# Patient Record
Sex: FEMALE | Race: WHITE | Employment: OTHER | URBAN - NONMETROPOLITAN AREA
[De-identification: names, ages, dates, MRNs, and addresses within clinical notes are randomized per-mention and may not be internally consistent; named-entity substitution may affect disease eponyms.]

---

## 2017-01-24 ENCOUNTER — CLINICAL DOCUMENTATION (OUTPATIENT)
Dept: FAMILY MEDICINE CLINIC | Age: 82
End: 2017-01-24

## 2017-01-24 VITALS
DIASTOLIC BLOOD PRESSURE: 84 MMHG | HEART RATE: 80 BPM | TEMPERATURE: 97.3 F | SYSTOLIC BLOOD PRESSURE: 132 MMHG | BODY MASS INDEX: 23.94 KG/M2 | RESPIRATION RATE: 16 BRPM | WEIGHT: 106.8 LBS

## 2017-01-24 DIAGNOSIS — F02.80 ALZHEIMER'S DEMENTIA WITHOUT BEHAVIORAL DISTURBANCE, UNSPECIFIED TIMING OF DEMENTIA ONSET: Primary | Chronic | ICD-10-CM

## 2017-01-24 DIAGNOSIS — M81.0 OSTEOPOROSIS: Chronic | ICD-10-CM

## 2017-01-24 DIAGNOSIS — G30.9 ALZHEIMER'S DEMENTIA WITHOUT BEHAVIORAL DISTURBANCE, UNSPECIFIED TIMING OF DEMENTIA ONSET: Primary | Chronic | ICD-10-CM

## 2017-01-24 DIAGNOSIS — I10 ESSENTIAL HYPERTENSION: Chronic | ICD-10-CM

## 2017-01-24 DIAGNOSIS — H40.1130 PRIMARY OPEN ANGLE GLAUCOMA OF BOTH EYES, UNSPECIFIED GLAUCOMA STAGE: Chronic | ICD-10-CM

## 2017-01-24 DIAGNOSIS — D69.6 THROMBOCYTOPENIA (HCC): ICD-10-CM

## 2017-01-24 DIAGNOSIS — F41.1 GAD (GENERALIZED ANXIETY DISORDER): Chronic | ICD-10-CM

## 2017-01-24 DIAGNOSIS — M15.9 PRIMARY OSTEOARTHRITIS INVOLVING MULTIPLE JOINTS: Chronic | ICD-10-CM

## 2017-03-28 ENCOUNTER — CLINICAL DOCUMENTATION (OUTPATIENT)
Dept: FAMILY MEDICINE CLINIC | Age: 82
End: 2017-03-28

## 2017-03-28 VITALS
BODY MASS INDEX: 24.12 KG/M2 | HEART RATE: 73 BPM | TEMPERATURE: 99.1 F | SYSTOLIC BLOOD PRESSURE: 114 MMHG | DIASTOLIC BLOOD PRESSURE: 65 MMHG | WEIGHT: 107.2 LBS | RESPIRATION RATE: 16 BRPM | HEIGHT: 56 IN

## 2017-03-28 DIAGNOSIS — M81.0 OSTEOPOROSIS: Chronic | ICD-10-CM

## 2017-03-28 DIAGNOSIS — D69.6 THROMBOCYTOPENIA (HCC): ICD-10-CM

## 2017-03-28 DIAGNOSIS — F02.80 ALZHEIMER'S DEMENTIA WITHOUT BEHAVIORAL DISTURBANCE, UNSPECIFIED TIMING OF DEMENTIA ONSET: Primary | Chronic | ICD-10-CM

## 2017-03-28 DIAGNOSIS — M15.9 PRIMARY OSTEOARTHRITIS INVOLVING MULTIPLE JOINTS: Chronic | ICD-10-CM

## 2017-03-28 DIAGNOSIS — H40.1130 PRIMARY OPEN ANGLE GLAUCOMA OF BOTH EYES, UNSPECIFIED GLAUCOMA STAGE: Chronic | ICD-10-CM

## 2017-03-28 DIAGNOSIS — G30.9 ALZHEIMER'S DEMENTIA WITHOUT BEHAVIORAL DISTURBANCE, UNSPECIFIED TIMING OF DEMENTIA ONSET: Primary | Chronic | ICD-10-CM

## 2017-03-28 DIAGNOSIS — I10 ESSENTIAL HYPERTENSION: Chronic | ICD-10-CM

## 2017-03-28 DIAGNOSIS — F41.1 GAD (GENERALIZED ANXIETY DISORDER): Chronic | ICD-10-CM

## 2017-06-27 ENCOUNTER — CLINICAL DOCUMENTATION (OUTPATIENT)
Dept: FAMILY MEDICINE CLINIC | Age: 82
End: 2017-06-27

## 2017-06-27 VITALS
HEIGHT: 56 IN | WEIGHT: 109.9 LBS | BODY MASS INDEX: 24.72 KG/M2 | RESPIRATION RATE: 16 BRPM | SYSTOLIC BLOOD PRESSURE: 117 MMHG | HEART RATE: 70 BPM | DIASTOLIC BLOOD PRESSURE: 64 MMHG | TEMPERATURE: 97.9 F

## 2017-06-27 DIAGNOSIS — I10 ESSENTIAL HYPERTENSION: Chronic | ICD-10-CM

## 2017-06-27 DIAGNOSIS — F02.80 ALZHEIMER'S DEMENTIA WITHOUT BEHAVIORAL DISTURBANCE, UNSPECIFIED TIMING OF DEMENTIA ONSET: Primary | Chronic | ICD-10-CM

## 2017-06-27 DIAGNOSIS — G30.9 ALZHEIMER'S DEMENTIA WITHOUT BEHAVIORAL DISTURBANCE, UNSPECIFIED TIMING OF DEMENTIA ONSET: Primary | Chronic | ICD-10-CM

## 2017-06-27 DIAGNOSIS — M15.9 PRIMARY OSTEOARTHRITIS INVOLVING MULTIPLE JOINTS: Chronic | ICD-10-CM

## 2017-06-27 DIAGNOSIS — H40.1130 PRIMARY OPEN ANGLE GLAUCOMA OF BOTH EYES, UNSPECIFIED GLAUCOMA STAGE: Chronic | ICD-10-CM

## 2017-06-27 DIAGNOSIS — M81.0 OSTEOPOROSIS: Chronic | ICD-10-CM

## 2017-06-27 DIAGNOSIS — D69.6 THROMBOCYTOPENIA (HCC): ICD-10-CM

## 2017-06-27 DIAGNOSIS — F41.1 GAD (GENERALIZED ANXIETY DISORDER): Chronic | ICD-10-CM

## 2017-07-03 ENCOUNTER — OFFICE VISIT (OUTPATIENT)
Dept: OTOLARYNGOLOGY | Age: 82
End: 2017-07-03

## 2017-07-03 VITALS
SYSTOLIC BLOOD PRESSURE: 114 MMHG | WEIGHT: 107.1 LBS | RESPIRATION RATE: 16 BRPM | TEMPERATURE: 98.4 F | DIASTOLIC BLOOD PRESSURE: 66 MMHG | BODY MASS INDEX: 24.01 KG/M2 | HEART RATE: 76 BPM

## 2017-07-03 DIAGNOSIS — H61.23 BILATERAL IMPACTED CERUMEN: Primary | ICD-10-CM

## 2017-07-03 PROCEDURE — 69210 REMOVE IMPACTED EAR WAX UNI: CPT | Performed by: NURSE PRACTITIONER

## 2017-07-03 RX ORDER — PROMETHAZINE HYDROCHLORIDE 25 MG/ML
12.5 INJECTION, SOLUTION INTRAMUSCULAR; INTRAVENOUS EVERY 8 HOURS PRN
COMMUNITY
End: 2018-09-03

## 2017-07-03 RX ORDER — HYDROXYZINE PAMOATE 25 MG/1
25 CAPSULE ORAL EVERY 8 HOURS PRN
COMMUNITY
End: 2018-09-03

## 2017-07-03 RX ORDER — LORATADINE 10 MG/1
10 CAPSULE, LIQUID FILLED ORAL DAILY
COMMUNITY
End: 2018-09-03

## 2017-07-03 ASSESSMENT — ENCOUNTER SYMPTOMS
RHINORRHEA: 0
BACK PAIN: 0
EYE PAIN: 0
VOMITING: 0
COUGH: 0
ANAL BLEEDING: 0
VOICE CHANGE: 0
SINUS PRESSURE: 0
RECTAL PAIN: 0
CHOKING: 0
APNEA: 0
STRIDOR: 0
NAUSEA: 0
DIARRHEA: 0
ABDOMINAL DISTENTION: 0
COLOR CHANGE: 0
SORE THROAT: 0
PHOTOPHOBIA: 0
EYE REDNESS: 0
FACIAL SWELLING: 0
SHORTNESS OF BREATH: 0
CONSTIPATION: 0
TROUBLE SWALLOWING: 0
BLOOD IN STOOL: 0
EYE DISCHARGE: 0
WHEEZING: 0
ABDOMINAL PAIN: 0
CHEST TIGHTNESS: 0
EYE ITCHING: 0

## 2017-09-19 ENCOUNTER — CLINICAL DOCUMENTATION (OUTPATIENT)
Dept: FAMILY MEDICINE CLINIC | Age: 82
End: 2017-09-19

## 2017-09-19 VITALS
SYSTOLIC BLOOD PRESSURE: 104 MMHG | RESPIRATION RATE: 16 BRPM | HEIGHT: 56 IN | BODY MASS INDEX: 24.43 KG/M2 | WEIGHT: 108.6 LBS | HEART RATE: 74 BPM | DIASTOLIC BLOOD PRESSURE: 59 MMHG | TEMPERATURE: 98 F

## 2017-09-19 DIAGNOSIS — G30.9 ALZHEIMER'S DEMENTIA WITHOUT BEHAVIORAL DISTURBANCE, UNSPECIFIED TIMING OF DEMENTIA ONSET: Primary | Chronic | ICD-10-CM

## 2017-09-19 DIAGNOSIS — M81.0 AGE-RELATED OSTEOPOROSIS WITHOUT CURRENT PATHOLOGICAL FRACTURE: Chronic | ICD-10-CM

## 2017-09-19 DIAGNOSIS — M15.9 PRIMARY OSTEOARTHRITIS INVOLVING MULTIPLE JOINTS: Chronic | ICD-10-CM

## 2017-09-19 DIAGNOSIS — F41.1 GAD (GENERALIZED ANXIETY DISORDER): Chronic | ICD-10-CM

## 2017-09-19 DIAGNOSIS — I10 ESSENTIAL HYPERTENSION: Chronic | ICD-10-CM

## 2017-09-19 DIAGNOSIS — F02.80 ALZHEIMER'S DEMENTIA WITHOUT BEHAVIORAL DISTURBANCE, UNSPECIFIED TIMING OF DEMENTIA ONSET: Primary | Chronic | ICD-10-CM

## 2017-09-19 DIAGNOSIS — D69.6 THROMBOCYTOPENIA (HCC): ICD-10-CM

## 2017-09-19 DIAGNOSIS — H40.1130 PRIMARY OPEN ANGLE GLAUCOMA OF BOTH EYES, UNSPECIFIED GLAUCOMA STAGE: Chronic | ICD-10-CM

## 2017-12-19 ENCOUNTER — CLINICAL DOCUMENTATION (OUTPATIENT)
Dept: FAMILY MEDICINE CLINIC | Age: 82
End: 2017-12-19

## 2017-12-19 VITALS
RESPIRATION RATE: 16 BRPM | HEIGHT: 56 IN | DIASTOLIC BLOOD PRESSURE: 75 MMHG | BODY MASS INDEX: 24.1 KG/M2 | HEART RATE: 67 BPM | WEIGHT: 107.12 LBS | TEMPERATURE: 98.4 F | SYSTOLIC BLOOD PRESSURE: 137 MMHG

## 2017-12-19 DIAGNOSIS — G30.9 ALZHEIMER'S DEMENTIA WITHOUT BEHAVIORAL DISTURBANCE, UNSPECIFIED TIMING OF DEMENTIA ONSET: Primary | ICD-10-CM

## 2017-12-19 DIAGNOSIS — F41.1 GAD (GENERALIZED ANXIETY DISORDER): ICD-10-CM

## 2017-12-19 DIAGNOSIS — M81.0 AGE-RELATED OSTEOPOROSIS WITHOUT CURRENT PATHOLOGICAL FRACTURE: ICD-10-CM

## 2017-12-19 DIAGNOSIS — H40.1130 PRIMARY OPEN ANGLE GLAUCOMA OF BOTH EYES, UNSPECIFIED GLAUCOMA STAGE: ICD-10-CM

## 2017-12-19 DIAGNOSIS — F02.80 ALZHEIMER'S DEMENTIA WITHOUT BEHAVIORAL DISTURBANCE, UNSPECIFIED TIMING OF DEMENTIA ONSET: Primary | ICD-10-CM

## 2017-12-19 DIAGNOSIS — I10 ESSENTIAL HYPERTENSION: ICD-10-CM

## 2017-12-19 DIAGNOSIS — M15.9 PRIMARY OSTEOARTHRITIS INVOLVING MULTIPLE JOINTS: ICD-10-CM

## 2017-12-19 DIAGNOSIS — D69.6 THROMBOCYTOPENIA (HCC): ICD-10-CM

## 2017-12-19 NOTE — PROGRESS NOTES
Dolores Miranda Progress Note    NAME: Tino [address]  DATE: 17  ROOM #: 77-1  : 10/5/1923  REASON FOR VISIT: Regular Visit  CODE STATUS: DNR/CC    History obtained from chart review, the patient and nursing staff. Pt with fairly advanced dementia, so hx from her is limited. SUBJECTIVE:  HPI: Babs Fragoso is a 80 y.o. female. Pt seen and examined at bedside. Confusion/dementia: dementia stable, has done well in dementia unit. No delusions or behavior issues. Mild exit seeking behavior on occasion but no worse than usual.     HTN: BPs <140/90. No CP/SOB    Anxiety: anxiety stable. Ativan changed to prn vistaril by JEWEL Mohr, but hadn't needed, so stopped. No aggression or combativeness typically. Pt does not feel anxious today. Currently not on PRN medication and doing fine w/o it     Osteoporosis: on brianne with D. Used to be on fosamax as well, but stopped in OCT at family request d/t her age. So far has done well without. No falls or fragility fractures. OA: changed scheduled aleve to prn tylenol and has done well with this. Pain appears controlled. No changes from last visit. Glaucoma: on eye drops, tolerating well. opto is following. They have recently adjusted her regimen from last time I saw her. Low platelets PRIOR VISIT: noted on labs, mild. No bleeding or bruising noted. No fevers, chills, night sweats or wt loss per pt or nursing staff. UPDATE PRIOR VISIT: labs improved. Denies bleeding, fevers or fatigue. Due for some f/u lab work. This was inexplicable not done last visit. Reordered today. UPDATE TODAY: labs stable. No bleeding or bruising. No change from prior visit. I have reviewed patients past medical, surgical, social, and family history and have made updates where appropriate. Allergies and Medications were reviewed through the [facility] EMR.       Patient Active Problem List    Diagnosis Date Noted    Alzheimer's dementia     ELVIRA (generalized anxiety planned.      7. Thrombocytopenia ([facility] [address]    Improved  Monitor periodicaly      Electronically signed by Santi Kwok DO on 12/19/2017 at 9:10 AM

## 2018-03-11 NOTE — PROGRESS NOTES
eyes, unspecified glaucoma stage    con't with adjusted regimen  F/u with opto as planned. 7. Thrombocytopenia (HCC)    Improved  Monitor periodicaly    8.  Weight loss    Mild  Monitor for now      Electronically signed by Addie Bruno DO on 3/15/2018 at 11:48 AM

## 2018-03-15 ENCOUNTER — CLINICAL DOCUMENTATION (OUTPATIENT)
Dept: FAMILY MEDICINE CLINIC | Age: 83
End: 2018-03-15

## 2018-03-15 VITALS
DIASTOLIC BLOOD PRESSURE: 68 MMHG | HEART RATE: 74 BPM | WEIGHT: 103 LBS | RESPIRATION RATE: 16 BRPM | TEMPERATURE: 98.4 F | HEIGHT: 56 IN | BODY MASS INDEX: 23.17 KG/M2 | SYSTOLIC BLOOD PRESSURE: 124 MMHG

## 2018-03-15 DIAGNOSIS — G30.9 ALZHEIMER'S DEMENTIA WITHOUT BEHAVIORAL DISTURBANCE, UNSPECIFIED TIMING OF DEMENTIA ONSET: Primary | ICD-10-CM

## 2018-03-15 DIAGNOSIS — I10 ESSENTIAL HYPERTENSION: ICD-10-CM

## 2018-03-15 DIAGNOSIS — R63.4 WEIGHT LOSS: ICD-10-CM

## 2018-03-15 DIAGNOSIS — F02.80 ALZHEIMER'S DEMENTIA WITHOUT BEHAVIORAL DISTURBANCE, UNSPECIFIED TIMING OF DEMENTIA ONSET: Primary | ICD-10-CM

## 2018-03-15 DIAGNOSIS — H40.1130 PRIMARY OPEN ANGLE GLAUCOMA OF BOTH EYES, UNSPECIFIED GLAUCOMA STAGE: ICD-10-CM

## 2018-03-15 DIAGNOSIS — D69.6 THROMBOCYTOPENIA (HCC): ICD-10-CM

## 2018-03-15 DIAGNOSIS — M15.9 PRIMARY OSTEOARTHRITIS INVOLVING MULTIPLE JOINTS: ICD-10-CM

## 2018-03-15 DIAGNOSIS — F41.1 GAD (GENERALIZED ANXIETY DISORDER): ICD-10-CM

## 2018-03-15 DIAGNOSIS — M81.0 AGE-RELATED OSTEOPOROSIS WITHOUT CURRENT PATHOLOGICAL FRACTURE: ICD-10-CM

## 2018-06-14 ENCOUNTER — CLINICAL DOCUMENTATION (OUTPATIENT)
Dept: FAMILY MEDICINE CLINIC | Age: 83
End: 2018-06-14

## 2018-06-14 VITALS
BODY MASS INDEX: 21.78 KG/M2 | TEMPERATURE: 98.7 F | SYSTOLIC BLOOD PRESSURE: 119 MMHG | HEIGHT: 56 IN | RESPIRATION RATE: 16 BRPM | DIASTOLIC BLOOD PRESSURE: 62 MMHG | HEART RATE: 86 BPM | WEIGHT: 96.8 LBS

## 2018-06-14 DIAGNOSIS — H40.1130 PRIMARY OPEN ANGLE GLAUCOMA OF BOTH EYES, UNSPECIFIED GLAUCOMA STAGE: Chronic | ICD-10-CM

## 2018-06-14 DIAGNOSIS — F02.80 ALZHEIMER'S DEMENTIA WITHOUT BEHAVIORAL DISTURBANCE, UNSPECIFIED TIMING OF DEMENTIA ONSET: Primary | Chronic | ICD-10-CM

## 2018-06-14 DIAGNOSIS — D69.6 THROMBOCYTOPENIA (HCC): ICD-10-CM

## 2018-06-14 DIAGNOSIS — M81.0 AGE-RELATED OSTEOPOROSIS WITHOUT CURRENT PATHOLOGICAL FRACTURE: Chronic | ICD-10-CM

## 2018-06-14 DIAGNOSIS — R63.4 WEIGHT LOSS: ICD-10-CM

## 2018-06-14 DIAGNOSIS — I10 ESSENTIAL HYPERTENSION: Chronic | ICD-10-CM

## 2018-06-14 DIAGNOSIS — M15.9 PRIMARY OSTEOARTHRITIS INVOLVING MULTIPLE JOINTS: Chronic | ICD-10-CM

## 2018-06-14 DIAGNOSIS — F41.1 GAD (GENERALIZED ANXIETY DISORDER): Chronic | ICD-10-CM

## 2018-06-14 DIAGNOSIS — G30.9 ALZHEIMER'S DEMENTIA WITHOUT BEHAVIORAL DISTURBANCE, UNSPECIFIED TIMING OF DEMENTIA ONSET: Primary | Chronic | ICD-10-CM

## 2018-09-03 PROBLEM — R63.4 WEIGHT LOSS: Status: ACTIVE | Noted: 2018-09-03

## 2018-09-03 NOTE — PROGRESS NOTES
Dol[facility] Miranda Progress Note    NAME: Manisha [address]  DATE: 18  ROOM #: [id number]  : 10/5/1923  REASON FOR VISIT: Regular Visit  CODE STATUS: DNR/CC    History obtained from chart review, the patient and nursing staff. Pt with fairly advanced dementia, so hx from her is limited. SUBJECTIVE:  HPI: Chantel Luo is a 80 y.o. female. Pt seen and examined at bedside. Confusion/dementia/Anxiety/mood disorder LAST VISIT: dementia progressing over tm, has done well in dementia unit. No delusions or behavior issues. Mild exit seeking behavior on occasion but no worse than usual. Staff w/o concerns this AM    UPDATE TODAY: Namenda stopped by JEWEL Mohr after discussion with family. CJ, psych NP, saw pt end of July and added seroquel and prn vistaril for worsening mood d/o and anxiety. She is on celexa as well. Since then has done a bit better with moods and behaviors. No aggression. HTN: BPs <140/90. No CP/SOB. On norvasc. Lopressor stopped by JEWEL Mohr. Osteoporosis: on brianne with D. Used to be on fosamax as well, but stopped in OCT 2017 at family request d/t her age. So far has done well without. No falls or fragility fractures. OA: changed scheduled aleve to prn tylenol and has done well with this. Pain appears controlled. No changes from last visit. Glaucoma: on eye drops, tolerating well. opto is following. Low platelets PRIOR VISIT: noted on labs, mild. No bleeding or bruising noted. No fevers, chills, night sweats or wt loss per pt or nursing staff. UPDATE PRIOR VISIT: labs improved. Denies bleeding, fevers or fatigue. Due for some f/u lab work. This was inexplicable not done last visit. Reordered today. UPDATE TODAY: labs stable. No bleeding or bruising. No change from prior visit. WEIGHT LOSS: appetite comes and goes. wts had been up and down, pretty stable over he last few months though. Appetite waxes and wanes.  R a    Wt Readings from Last 3 Encounters:   [phone]

## 2018-09-06 ENCOUNTER — CLINICAL DOCUMENTATION (OUTPATIENT)
Dept: FAMILY MEDICINE CLINIC | Age: 83
End: 2018-09-06

## 2018-09-06 VITALS
RESPIRATION RATE: 16 BRPM | TEMPERATURE: 97.9 F | DIASTOLIC BLOOD PRESSURE: 62 MMHG | WEIGHT: 99 LBS | HEIGHT: 56 IN | BODY MASS INDEX: 22.27 KG/M2 | HEART RATE: 83 BPM | SYSTOLIC BLOOD PRESSURE: 110 MMHG

## 2018-09-06 DIAGNOSIS — M79.601 RIGHT ARM PAIN: ICD-10-CM

## 2018-09-06 DIAGNOSIS — F41.1 GAD (GENERALIZED ANXIETY DISORDER): ICD-10-CM

## 2018-09-06 DIAGNOSIS — F02.818 LATE ONSET ALZHEIMER'S DISEASE WITH BEHAVIORAL DISTURBANCE (HCC): Primary | ICD-10-CM

## 2018-09-06 DIAGNOSIS — G30.1 LATE ONSET ALZHEIMER'S DISEASE WITH BEHAVIORAL DISTURBANCE (HCC): Primary | ICD-10-CM

## 2018-09-06 DIAGNOSIS — F39 MOOD DISORDER (HCC): ICD-10-CM

## 2018-09-06 DIAGNOSIS — M81.0 AGE-RELATED OSTEOPOROSIS WITHOUT CURRENT PATHOLOGICAL FRACTURE: Chronic | ICD-10-CM

## 2018-09-06 DIAGNOSIS — R63.4 WEIGHT LOSS: ICD-10-CM

## 2018-09-06 DIAGNOSIS — H40.1130 PRIMARY OPEN ANGLE GLAUCOMA OF BOTH EYES, UNSPECIFIED GLAUCOMA STAGE: Chronic | ICD-10-CM

## 2018-09-06 DIAGNOSIS — I10 ESSENTIAL HYPERTENSION: Chronic | ICD-10-CM

## 2018-09-06 DIAGNOSIS — M15.9 PRIMARY OSTEOARTHRITIS INVOLVING MULTIPLE JOINTS: Chronic | ICD-10-CM

## 2018-09-06 DIAGNOSIS — D69.6 THROMBOCYTOPENIA (HCC): ICD-10-CM

## 2018-09-13 ENCOUNTER — APPOINTMENT (OUTPATIENT)
Dept: CT IMAGING | Age: 83
End: 2018-09-13
Payer: MEDICARE

## 2018-09-13 ENCOUNTER — HOSPITAL ENCOUNTER (EMERGENCY)
Age: 83
Discharge: NURSING, REHAB OR LONG-TERM CARE | End: 2018-09-13
Attending: FAMILY MEDICINE
Payer: MEDICARE

## 2018-09-13 ENCOUNTER — APPOINTMENT (OUTPATIENT)
Dept: GENERAL RADIOLOGY | Age: 83
End: 2018-09-13
Payer: MEDICARE

## 2018-09-13 VITALS
RESPIRATION RATE: 16 BRPM | BODY MASS INDEX: 22.27 KG/M2 | OXYGEN SATURATION: 97 % | TEMPERATURE: 98.2 F | WEIGHT: 99 LBS | HEIGHT: 56 IN | SYSTOLIC BLOOD PRESSURE: 142 MMHG | DIASTOLIC BLOOD PRESSURE: 90 MMHG | HEART RATE: 92 BPM
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
## 2018-09-13 DIAGNOSIS — M16.0 ARTHRITIS OF BOTH HIPS: ICD-10-CM

## 2018-09-13 PROCEDURE — 73700 CT LOWER EXTREMITY W/O DYE: CPT

## 2018-09-13 PROCEDURE — 73502 X-RAY EXAM HIP UNI 2-3 VIEWS: CPT

## 2018-09-13 PROCEDURE — 70450 CT HEAD/BRAIN W/O DYE: CPT

## 2018-09-13 PROCEDURE — 99285 EMERGENCY DEPT VISIT HI MDM: CPT

## 2018-09-13 ASSESSMENT — PAIN SCALES - PAIN ASSESSMENT IN ADVANCED DEMENTIA (PAINAD)
BREATHING: 1
TOTALSCORE: 5
NEGVOCALIZATION: 1
BODYLANGUAGE: 1
FACIALEXPRESSION: 1
CONSOLABILITY: 1

## 2018-09-13 NOTE — ED PROVIDER NOTES
[facility]  eMERGENCY dEPARTMENT eNCOUnter          CHIEF COMPLAINT       Chief Complaint   Patient presents with    Fall       Nurses Notes reviewed and I agree except as noted in the HPI. HISTORY OF PRESENT ILLNESS    Tressa Munoz is a 80 y.o. female who presents to the Emergency Department via EMS from [facility] for the evaluation of a fall that occurred around 1400. Per EMS, patient fell onto right hip and may have hit her head, but no reported LOC. Sent for further evaluation and management. No other complaints or concerns. HPI is limited due to patient's advanced dementia. The HPI was provided by the patient. REVIEW OF SYSTEMS     Review of Systems   Unable to perform ROS: Dementia       PAST MEDICAL HISTORY    has a past medical history of Alzheimer's dementia; CKD (chronic kidney disease) stage 3, GFR 30-59 ml/min; ELIVRA (generalized anxiety disorder); Glaucoma; Hypertension; Hyponatremia; Mood disorder ([address] Osteoarthritis; Osteoporosis; Scoliosis; and SNHL (sensorineural hearing loss). SURGICAL HISTORY      has a past surgical history that includes Appendectomy (1932) and Myringotomy Tympanostomy Tube Placement (Right, 2013). CURRENT MEDICATIONS       There are no discharge medications for this patient. ALLERGIES     is allergic to pcn [penicillins] and bactrim [sulfamethoxazole-trimethoprim]. FAMILY HISTORY     indicated that her mother is . She indicated that her father is . She indicated that the status of her brother is unknown.    family history includes Heart Disease in her father; Stroke in her brother and mother. SOCIAL HISTORY      reports that she has never smoked. She has never used smokeless tobacco. She reports that she does not drink alcohol or use drugs. PHYSICAL EXAM     INITIAL VITALS:  height is 4' 8\" (1.422 m) and weight is 99 lb (44.9 kg). Her temperature is 98.2 °F (36.8 °C).  Her blood pressure is Scribing for and in the presence of Mireya Herrera MD.    Signed by: Tiffany Anaya, 09/13/18 7:32 PM    Provider:  I personally performed the services described in the documentation, reviewed and edited the documentation which was dictated to the scribe in my presence, and it accurately records my words and actions.     Mireya Herrera MD 9/13/18 7:32 PM       Mireya Herrera MD  09/13/18 [id number]

## 2018-10-08 ENCOUNTER — TELEPHONE (OUTPATIENT)
Dept: FAMILY MEDICINE CLINIC | Age: 83
End: 2018-10-08

## 2018-11-27 ENCOUNTER — TELEPHONE (OUTPATIENT)
Dept: FAMILY MEDICINE CLINIC | Age: 83
End: 2018-11-27

## 2024-05-19 NOTE — ED NOTES
Bed: 032A  Expected date: 9/13/18  Expected time: 2:35 PM  Means of arrival: [facility]  Comments:     Benigno Cao RN  09/13/18 [id number]
Pt resting in bed with side rails up 2x2 and call light in reach. Posey in place.  Ryanne Jean RN  09/13/18 [id number]
Report called to [facility]. [facility] called and gave a 1830 ETA on .       Warren Burks RN  09/13/18 9809
lower back pain